# Patient Record
Sex: FEMALE | Race: OTHER | NOT HISPANIC OR LATINO
[De-identification: names, ages, dates, MRNs, and addresses within clinical notes are randomized per-mention and may not be internally consistent; named-entity substitution may affect disease eponyms.]

---

## 2017-01-04 ENCOUNTER — APPOINTMENT (OUTPATIENT)
Dept: UROGYNECOLOGY | Facility: CLINIC | Age: 57
End: 2017-01-04

## 2017-01-09 ENCOUNTER — APPOINTMENT (OUTPATIENT)
Dept: UROLOGY | Facility: CLINIC | Age: 57
End: 2017-01-09

## 2017-01-17 ENCOUNTER — APPOINTMENT (OUTPATIENT)
Dept: PULMONOLOGY | Facility: CLINIC | Age: 57
End: 2017-01-17

## 2017-01-31 ENCOUNTER — APPOINTMENT (OUTPATIENT)
Dept: UROGYNECOLOGY | Facility: CLINIC | Age: 57
End: 2017-01-31

## 2017-02-10 ENCOUNTER — MEDICATION RENEWAL (OUTPATIENT)
Age: 57
End: 2017-02-10

## 2017-02-21 ENCOUNTER — OTHER (OUTPATIENT)
Age: 57
End: 2017-02-21

## 2017-06-12 ENCOUNTER — APPOINTMENT (OUTPATIENT)
Dept: MAMMOGRAPHY | Facility: IMAGING CENTER | Age: 57
End: 2017-06-12

## 2017-06-12 ENCOUNTER — OUTPATIENT (OUTPATIENT)
Dept: OUTPATIENT SERVICES | Facility: HOSPITAL | Age: 57
LOS: 1 days | End: 2017-06-12
Payer: COMMERCIAL

## 2017-06-12 DIAGNOSIS — Z00.8 ENCOUNTER FOR OTHER GENERAL EXAMINATION: ICD-10-CM

## 2017-06-12 PROCEDURE — 77063 BREAST TOMOSYNTHESIS BI: CPT

## 2017-06-12 PROCEDURE — 77067 SCR MAMMO BI INCL CAD: CPT

## 2017-12-05 ENCOUNTER — APPOINTMENT (OUTPATIENT)
Dept: UROGYNECOLOGY | Facility: CLINIC | Age: 57
End: 2017-12-05

## 2018-02-12 ENCOUNTER — APPOINTMENT (OUTPATIENT)
Dept: UROLOGY | Facility: CLINIC | Age: 58
End: 2018-02-12
Payer: COMMERCIAL

## 2018-02-12 PROCEDURE — 99214 OFFICE O/P EST MOD 30 MIN: CPT

## 2018-03-12 ENCOUNTER — APPOINTMENT (OUTPATIENT)
Dept: UROLOGY | Facility: CLINIC | Age: 58
End: 2018-03-12
Payer: COMMERCIAL

## 2018-03-12 ENCOUNTER — OUTPATIENT (OUTPATIENT)
Dept: OUTPATIENT SERVICES | Facility: HOSPITAL | Age: 58
LOS: 1 days | End: 2018-03-12
Payer: COMMERCIAL

## 2018-03-12 DIAGNOSIS — R35.0 FREQUENCY OF MICTURITION: ICD-10-CM

## 2018-03-12 PROCEDURE — 51784 ANAL/URINARY MUSCLE STUDY: CPT | Mod: 26

## 2018-03-12 PROCEDURE — 51797 INTRAABDOMINAL PRESSURE TEST: CPT | Mod: 26

## 2018-03-12 PROCEDURE — 51728 CYSTOMETROGRAM W/VP: CPT | Mod: 26

## 2018-03-12 PROCEDURE — 51741 ELECTRO-UROFLOWMETRY FIRST: CPT | Mod: 26

## 2018-03-12 PROCEDURE — 51784 ANAL/URINARY MUSCLE STUDY: CPT

## 2018-03-12 PROCEDURE — 51741 ELECTRO-UROFLOWMETRY FIRST: CPT

## 2018-03-12 PROCEDURE — 51728 CYSTOMETROGRAM W/VP: CPT

## 2018-03-12 PROCEDURE — 51797 INTRAABDOMINAL PRESSURE TEST: CPT

## 2018-03-14 DIAGNOSIS — N32.81 OVERACTIVE BLADDER: ICD-10-CM

## 2018-03-15 ENCOUNTER — APPOINTMENT (OUTPATIENT)
Dept: UROLOGY | Facility: CLINIC | Age: 58
End: 2018-03-15
Payer: COMMERCIAL

## 2018-03-15 PROCEDURE — 99214 OFFICE O/P EST MOD 30 MIN: CPT

## 2018-03-19 ENCOUNTER — APPOINTMENT (OUTPATIENT)
Dept: UROLOGY | Facility: CLINIC | Age: 58
End: 2018-03-19

## 2018-04-09 ENCOUNTER — CHART COPY (OUTPATIENT)
Age: 58
End: 2018-04-09

## 2018-04-12 ENCOUNTER — RECORD ABSTRACTING (OUTPATIENT)
Age: 58
End: 2018-04-12

## 2018-04-16 ENCOUNTER — APPOINTMENT (OUTPATIENT)
Dept: UROLOGY | Facility: CLINIC | Age: 58
End: 2018-04-16

## 2018-04-16 ENCOUNTER — APPOINTMENT (OUTPATIENT)
Dept: GYNECOLOGIC ONCOLOGY | Facility: CLINIC | Age: 58
End: 2018-04-16
Payer: COMMERCIAL

## 2018-04-16 VITALS
DIASTOLIC BLOOD PRESSURE: 70 MMHG | BODY MASS INDEX: 24.91 KG/M2 | HEIGHT: 66 IN | WEIGHT: 155 LBS | SYSTOLIC BLOOD PRESSURE: 120 MMHG

## 2018-04-16 DIAGNOSIS — Z80.3 FAMILY HISTORY OF MALIGNANT NEOPLASM OF BREAST: ICD-10-CM

## 2018-04-16 DIAGNOSIS — Z80.52 FAMILY HISTORY OF MALIGNANT NEOPLASM OF BLADDER: ICD-10-CM

## 2018-04-16 DIAGNOSIS — Z63.4 DISAPPEARANCE AND DEATH OF FAMILY MEMBER: ICD-10-CM

## 2018-04-16 DIAGNOSIS — Z80.8 FAMILY HISTORY OF MALIGNANT NEOPLASM OF OTHER ORGANS OR SYSTEMS: ICD-10-CM

## 2018-04-16 DIAGNOSIS — Z80.1 FAMILY HISTORY OF MALIGNANT NEOPLASM OF TRACHEA, BRONCHUS AND LUNG: ICD-10-CM

## 2018-04-16 DIAGNOSIS — N88.2 STRICTURE AND STENOSIS OF CERVIX UTERI: ICD-10-CM

## 2018-04-16 DIAGNOSIS — Z78.9 OTHER SPECIFIED HEALTH STATUS: ICD-10-CM

## 2018-04-16 PROCEDURE — 99205 OFFICE O/P NEW HI 60 MIN: CPT

## 2018-04-16 SDOH — SOCIAL STABILITY - SOCIAL INSECURITY: DISSAPEARANCE AND DEATH OF FAMILY MEMBER: Z63.4

## 2018-04-18 ENCOUNTER — APPOINTMENT (OUTPATIENT)
Dept: ULTRASOUND IMAGING | Facility: CLINIC | Age: 58
End: 2018-04-18
Payer: COMMERCIAL

## 2018-04-24 ENCOUNTER — FORM ENCOUNTER (OUTPATIENT)
Age: 58
End: 2018-04-24

## 2018-04-25 ENCOUNTER — OUTPATIENT (OUTPATIENT)
Dept: OUTPATIENT SERVICES | Facility: HOSPITAL | Age: 58
LOS: 1 days | End: 2018-04-25
Payer: COMMERCIAL

## 2018-04-25 ENCOUNTER — CHART COPY (OUTPATIENT)
Age: 58
End: 2018-04-25

## 2018-04-25 ENCOUNTER — APPOINTMENT (OUTPATIENT)
Dept: ULTRASOUND IMAGING | Facility: CLINIC | Age: 58
End: 2018-04-25

## 2018-04-25 DIAGNOSIS — N81.4 UTEROVAGINAL PROLAPSE, UNSPECIFIED: ICD-10-CM

## 2018-04-25 DIAGNOSIS — Z00.8 ENCOUNTER FOR OTHER GENERAL EXAMINATION: ICD-10-CM

## 2018-04-25 PROCEDURE — 76830 TRANSVAGINAL US NON-OB: CPT

## 2018-04-25 PROCEDURE — 76830 TRANSVAGINAL US NON-OB: CPT | Mod: 26

## 2018-04-25 PROCEDURE — 76856 US EXAM PELVIC COMPLETE: CPT | Mod: 26

## 2018-04-25 PROCEDURE — 76856 US EXAM PELVIC COMPLETE: CPT

## 2018-04-30 ENCOUNTER — CHART COPY (OUTPATIENT)
Age: 58
End: 2018-04-30

## 2018-04-30 ENCOUNTER — RESULT REVIEW (OUTPATIENT)
Age: 58
End: 2018-04-30

## 2018-06-18 ENCOUNTER — APPOINTMENT (OUTPATIENT)
Dept: MAMMOGRAPHY | Facility: IMAGING CENTER | Age: 58
End: 2018-06-18
Payer: COMMERCIAL

## 2018-06-18 ENCOUNTER — OUTPATIENT (OUTPATIENT)
Dept: OUTPATIENT SERVICES | Facility: HOSPITAL | Age: 58
LOS: 1 days | End: 2018-06-18
Payer: COMMERCIAL

## 2018-06-18 DIAGNOSIS — Z00.00 ENCOUNTER FOR GENERAL ADULT MEDICAL EXAMINATION WITHOUT ABNORMAL FINDINGS: ICD-10-CM

## 2018-06-18 PROCEDURE — 77063 BREAST TOMOSYNTHESIS BI: CPT | Mod: 26

## 2018-06-18 PROCEDURE — 77067 SCR MAMMO BI INCL CAD: CPT | Mod: 26

## 2018-06-18 PROCEDURE — 77067 SCR MAMMO BI INCL CAD: CPT

## 2018-06-18 PROCEDURE — 77063 BREAST TOMOSYNTHESIS BI: CPT

## 2018-06-21 ENCOUNTER — OUTPATIENT (OUTPATIENT)
Dept: OUTPATIENT SERVICES | Facility: HOSPITAL | Age: 58
LOS: 1 days | End: 2018-06-21
Payer: COMMERCIAL

## 2018-06-21 ENCOUNTER — APPOINTMENT (OUTPATIENT)
Dept: MAMMOGRAPHY | Facility: IMAGING CENTER | Age: 58
End: 2018-06-21
Payer: COMMERCIAL

## 2018-06-21 ENCOUNTER — APPOINTMENT (OUTPATIENT)
Dept: ULTRASOUND IMAGING | Facility: IMAGING CENTER | Age: 58
End: 2018-06-21
Payer: COMMERCIAL

## 2018-06-21 DIAGNOSIS — Z00.8 ENCOUNTER FOR OTHER GENERAL EXAMINATION: ICD-10-CM

## 2018-06-21 PROCEDURE — 77065 DX MAMMO INCL CAD UNI: CPT | Mod: 26

## 2018-06-21 PROCEDURE — 77065 DX MAMMO INCL CAD UNI: CPT

## 2018-06-21 PROCEDURE — 76641 ULTRASOUND BREAST COMPLETE: CPT | Mod: 26,RT

## 2018-06-21 PROCEDURE — G0279: CPT

## 2018-06-21 PROCEDURE — 77061 BREAST TOMOSYNTHESIS UNI: CPT | Mod: 26,RT

## 2018-06-21 PROCEDURE — G0279: CPT | Mod: 26

## 2018-06-21 PROCEDURE — 76641 ULTRASOUND BREAST COMPLETE: CPT

## 2018-07-11 ENCOUNTER — OTHER (OUTPATIENT)
Age: 58
End: 2018-07-11

## 2018-07-18 ENCOUNTER — OUTPATIENT (OUTPATIENT)
Dept: OUTPATIENT SERVICES | Facility: HOSPITAL | Age: 58
LOS: 1 days | End: 2018-07-18
Payer: COMMERCIAL

## 2018-07-18 VITALS
OXYGEN SATURATION: 97 % | TEMPERATURE: 98 F | HEIGHT: 66 IN | DIASTOLIC BLOOD PRESSURE: 90 MMHG | SYSTOLIC BLOOD PRESSURE: 135 MMHG | RESPIRATION RATE: 15 BRPM | WEIGHT: 160.06 LBS | HEART RATE: 68 BPM

## 2018-07-18 DIAGNOSIS — N81.4 UTEROVAGINAL PROLAPSE, UNSPECIFIED: ICD-10-CM

## 2018-07-18 DIAGNOSIS — Z98.890 OTHER SPECIFIED POSTPROCEDURAL STATES: Chronic | ICD-10-CM

## 2018-07-18 LAB
ANION GAP SERPL CALC-SCNC: 11 MMOL/L — SIGNIFICANT CHANGE UP (ref 5–17)
BLD GP AB SCN SERPL QL: NEGATIVE — SIGNIFICANT CHANGE UP
BUN SERPL-MCNC: 19 MG/DL — SIGNIFICANT CHANGE UP (ref 7–23)
CALCIUM SERPL-MCNC: 9.3 MG/DL — SIGNIFICANT CHANGE UP (ref 8.4–10.5)
CHLORIDE SERPL-SCNC: 102 MMOL/L — SIGNIFICANT CHANGE UP (ref 96–108)
CO2 SERPL-SCNC: 28 MMOL/L — SIGNIFICANT CHANGE UP (ref 22–31)
CREAT SERPL-MCNC: 0.73 MG/DL — SIGNIFICANT CHANGE UP (ref 0.5–1.3)
GLUCOSE SERPL-MCNC: 74 MG/DL — SIGNIFICANT CHANGE UP (ref 70–99)
HBA1C BLD-MCNC: 5.2 % — SIGNIFICANT CHANGE UP (ref 4–5.6)
HCT VFR BLD CALC: 40 % — SIGNIFICANT CHANGE UP (ref 34.5–45)
HGB BLD-MCNC: 13.6 G/DL — SIGNIFICANT CHANGE UP (ref 11.5–15.5)
MCHC RBC-ENTMCNC: 31.9 PG — SIGNIFICANT CHANGE UP (ref 27–34)
MCHC RBC-ENTMCNC: 34 GM/DL — SIGNIFICANT CHANGE UP (ref 32–36)
MCV RBC AUTO: 93.7 FL — SIGNIFICANT CHANGE UP (ref 80–100)
PLATELET # BLD AUTO: 332 K/UL — SIGNIFICANT CHANGE UP (ref 150–400)
POTASSIUM SERPL-MCNC: 4 MMOL/L — SIGNIFICANT CHANGE UP (ref 3.5–5.3)
POTASSIUM SERPL-SCNC: 4 MMOL/L — SIGNIFICANT CHANGE UP (ref 3.5–5.3)
RBC # BLD: 4.27 M/UL — SIGNIFICANT CHANGE UP (ref 3.8–5.2)
RBC # FLD: 13.1 % — SIGNIFICANT CHANGE UP (ref 10.3–14.5)
RH IG SCN BLD-IMP: POSITIVE — SIGNIFICANT CHANGE UP
SODIUM SERPL-SCNC: 141 MMOL/L — SIGNIFICANT CHANGE UP (ref 135–145)
WBC # BLD: 4.2 K/UL — SIGNIFICANT CHANGE UP (ref 3.8–10.5)
WBC # FLD AUTO: 4.2 K/UL — SIGNIFICANT CHANGE UP (ref 3.8–10.5)

## 2018-07-18 PROCEDURE — 86900 BLOOD TYPING SEROLOGIC ABO: CPT

## 2018-07-18 PROCEDURE — 86850 RBC ANTIBODY SCREEN: CPT

## 2018-07-18 PROCEDURE — 83036 HEMOGLOBIN GLYCOSYLATED A1C: CPT

## 2018-07-18 PROCEDURE — 87086 URINE CULTURE/COLONY COUNT: CPT

## 2018-07-18 PROCEDURE — G0463: CPT

## 2018-07-18 PROCEDURE — 85027 COMPLETE CBC AUTOMATED: CPT

## 2018-07-18 PROCEDURE — 80048 BASIC METABOLIC PNL TOTAL CA: CPT

## 2018-07-18 PROCEDURE — 86901 BLOOD TYPING SEROLOGIC RH(D): CPT

## 2018-07-18 RX ORDER — FAMOTIDINE 10 MG/ML
20 INJECTION INTRAVENOUS ONCE
Qty: 0 | Refills: 0 | Status: COMPLETED | OUTPATIENT
Start: 2018-07-24 | End: 2018-07-24

## 2018-07-18 RX ORDER — ACETAMINOPHEN 500 MG
975 TABLET ORAL ONCE
Qty: 0 | Refills: 0 | Status: COMPLETED | OUTPATIENT
Start: 2018-07-24 | End: 2018-07-24

## 2018-07-18 NOTE — H&P PST ADULT - HEALTH CARE MAINTENANCE
+flu vaccine this season   Annual PE  +Colonoscopy 5/2018 +flu vaccine this season [id number]  Annual PE  +Colonoscopy 5/2018

## 2018-07-18 NOTE — H&P PST ADULT - PMH
GERD (gastroesophageal reflux disease)    Overactive bladder GERD (gastroesophageal reflux disease)    Overactive bladder    Uterovaginal prolapse

## 2018-07-18 NOTE — H&P PST ADULT - PROBLEM SELECTOR PLAN 1
Scheduled robotic sacrocolpopexy, supracervical hysterectomy, and bilateral salpingo oophorectomy on 7/24/2018  CBC, BMP, A1c, T&S, UCx sent at [facility]  Pre-op instructions given to pt

## 2018-07-18 NOTE — H&P PST ADULT - PSH
H/O breast biopsy  right, 2016-benign  H/O colonoscopy  5/2018  History of D&C  bleeding, 2013  History of D&C  Judy RAYMOND

## 2018-07-18 NOTE — H&P PST ADULT - HISTORY OF PRESENT ILLNESS
56 y/o  female with PMHx of GERD, overactive bladder has worsening uterine prolapse and mild urge incontinence. Denies vaginal bleeding abdominal bloating/pain, changes in bowel/urinary habits, N/V, fever or chills. Evaluated by Dr. Pena. Presents to PST for a scheduled robotic sacrocolpopexy, supracervical hysterectomy, and bilateral salpingo oophorectomy on 2018.

## 2018-07-18 NOTE — H&P PST ADULT - NSANTHOSAYNRD_GEN_A_CORE
No. AIRAM screening performed.  STOP BANG Legend: 0-2 = LOW Risk; 3-4 = INTERMEDIATE Risk; 5-8 = HIGH Risk

## 2018-07-19 LAB
CULTURE RESULTS: SIGNIFICANT CHANGE UP
SPECIMEN SOURCE: SIGNIFICANT CHANGE UP

## 2018-07-23 ENCOUNTER — OTHER (OUTPATIENT)
Age: 58
End: 2018-07-23

## 2018-07-23 ENCOUNTER — FORM ENCOUNTER (OUTPATIENT)
Age: 58
End: 2018-07-23

## 2018-07-24 ENCOUNTER — APPOINTMENT (OUTPATIENT)
Dept: GYNECOLOGIC ONCOLOGY | Facility: HOSPITAL | Age: 58
End: 2018-07-24

## 2018-07-24 ENCOUNTER — APPOINTMENT (OUTPATIENT)
Dept: UROLOGY | Facility: HOSPITAL | Age: 58
End: 2018-07-24

## 2018-07-24 ENCOUNTER — INPATIENT (INPATIENT)
Facility: HOSPITAL | Age: 58
LOS: 0 days | Discharge: HOME | DRG: 743 | End: 2018-07-25
Attending: SPECIALIST | Admitting: SPECIALIST
Payer: COMMERCIAL

## 2018-07-24 ENCOUNTER — RESULT REVIEW (OUTPATIENT)
Age: 58
End: 2018-07-24

## 2018-07-24 VITALS
HEART RATE: 68 BPM | SYSTOLIC BLOOD PRESSURE: 132 MMHG | HEIGHT: 66 IN | WEIGHT: 167.99 LBS | DIASTOLIC BLOOD PRESSURE: 89 MMHG | OXYGEN SATURATION: 96 % | TEMPERATURE: 98 F | RESPIRATION RATE: 18 BRPM

## 2018-07-24 DIAGNOSIS — N81.4 UTEROVAGINAL PROLAPSE, UNSPECIFIED: ICD-10-CM

## 2018-07-24 DIAGNOSIS — Z98.890 OTHER SPECIFIED POSTPROCEDURAL STATES: Chronic | ICD-10-CM

## 2018-07-24 LAB
ANION GAP SERPL CALC-SCNC: 14 MMOL/L — SIGNIFICANT CHANGE UP (ref 5–17)
BASOPHILS # BLD AUTO: 0 K/UL — SIGNIFICANT CHANGE UP (ref 0–0.2)
BASOPHILS NFR BLD AUTO: 0.2 % — SIGNIFICANT CHANGE UP (ref 0–2)
BUN SERPL-MCNC: 16 MG/DL — SIGNIFICANT CHANGE UP (ref 7–23)
CALCIUM SERPL-MCNC: 9.1 MG/DL — SIGNIFICANT CHANGE UP (ref 8.4–10.5)
CHLORIDE SERPL-SCNC: 103 MMOL/L — SIGNIFICANT CHANGE UP (ref 96–108)
CO2 SERPL-SCNC: 25 MMOL/L — SIGNIFICANT CHANGE UP (ref 22–31)
CREAT SERPL-MCNC: 0.84 MG/DL — SIGNIFICANT CHANGE UP (ref 0.5–1.3)
EOSINOPHIL # BLD AUTO: 0 K/UL — SIGNIFICANT CHANGE UP (ref 0–0.5)
EOSINOPHIL NFR BLD AUTO: 0.2 % — SIGNIFICANT CHANGE UP (ref 0–6)
GLUCOSE BLDC GLUCOMTR-MCNC: 95 MG/DL — SIGNIFICANT CHANGE UP (ref 70–99)
GLUCOSE SERPL-MCNC: 147 MG/DL — HIGH (ref 70–99)
HCT VFR BLD CALC: 41.9 % — SIGNIFICANT CHANGE UP (ref 34.5–45)
HGB BLD-MCNC: 14.2 G/DL — SIGNIFICANT CHANGE UP (ref 11.5–15.5)
LYMPHOCYTES # BLD AUTO: 0.7 K/UL — LOW (ref 1–3.3)
LYMPHOCYTES # BLD AUTO: 6.8 % — LOW (ref 13–44)
MCHC RBC-ENTMCNC: 31.5 PG — SIGNIFICANT CHANGE UP (ref 27–34)
MCHC RBC-ENTMCNC: 34 GM/DL — SIGNIFICANT CHANGE UP (ref 32–36)
MCV RBC AUTO: 92.6 FL — SIGNIFICANT CHANGE UP (ref 80–100)
MONOCYTES # BLD AUTO: 0 K/UL — SIGNIFICANT CHANGE UP (ref 0–0.9)
MONOCYTES NFR BLD AUTO: 0.2 % — LOW (ref 2–14)
NEUTROPHILS # BLD AUTO: 10 K/UL — HIGH (ref 1.8–7.4)
NEUTROPHILS NFR BLD AUTO: 92.6 % — HIGH (ref 43–77)
PLATELET # BLD AUTO: 320 K/UL — SIGNIFICANT CHANGE UP (ref 150–400)
POTASSIUM SERPL-MCNC: 4 MMOL/L — SIGNIFICANT CHANGE UP (ref 3.5–5.3)
POTASSIUM SERPL-SCNC: 4 MMOL/L — SIGNIFICANT CHANGE UP (ref 3.5–5.3)
RBC # BLD: 4.52 M/UL — SIGNIFICANT CHANGE UP (ref 3.8–5.2)
RBC # FLD: 11.9 % — SIGNIFICANT CHANGE UP (ref 10.3–14.5)
RH IG SCN BLD-IMP: POSITIVE — SIGNIFICANT CHANGE UP
SODIUM SERPL-SCNC: 142 MMOL/L — SIGNIFICANT CHANGE UP (ref 135–145)
WBC # BLD: 10.8 K/UL — HIGH (ref 3.8–10.5)
WBC # FLD AUTO: 10.8 K/UL — HIGH (ref 3.8–10.5)

## 2018-07-24 PROCEDURE — 88307 TISSUE EXAM BY PATHOLOGIST: CPT | Mod: 26

## 2018-07-24 PROCEDURE — 88305 TISSUE EXAM BY PATHOLOGIST: CPT | Mod: 26

## 2018-07-24 PROCEDURE — 88331 PATH CONSLTJ SURG 1 BLK 1SPC: CPT | Mod: 26

## 2018-07-24 PROCEDURE — 58542 LSH W/T/O UT 250 G OR LESS: CPT

## 2018-07-24 PROCEDURE — S2900 ROBOTIC SURGICAL SYSTEM: CPT | Mod: NC

## 2018-07-24 PROCEDURE — 57425 LAPAROSCOPY SURG COLPOPEXY: CPT

## 2018-07-24 PROCEDURE — 57800 DILATION OF CERVICAL CANAL: CPT | Mod: 59

## 2018-07-24 PROCEDURE — 52000 CYSTOURETHROSCOPY: CPT | Mod: 59

## 2018-07-24 PROCEDURE — 74018 RADEX ABDOMEN 1 VIEW: CPT | Mod: 26

## 2018-07-24 RX ORDER — SENNA PLUS 8.6 MG/1
2 TABLET ORAL AT BEDTIME
Qty: 0 | Refills: 0 | Status: DISCONTINUED | OUTPATIENT
Start: 2018-07-24 | End: 2018-07-25

## 2018-07-24 RX ORDER — SODIUM CHLORIDE 9 MG/ML
1000 INJECTION, SOLUTION INTRAVENOUS
Qty: 0 | Refills: 0 | Status: DISCONTINUED | OUTPATIENT
Start: 2018-07-24 | End: 2018-07-25

## 2018-07-24 RX ORDER — OMEPRAZOLE AND SODIUM BICARBONATE 40; 1100 MG/1; MG/1
1 CAPSULE, GELATIN COATED ORAL
Qty: 0 | Refills: 0 | COMMUNITY

## 2018-07-24 RX ORDER — HYDROMORPHONE HYDROCHLORIDE 2 MG/ML
0.5 INJECTION INTRAMUSCULAR; INTRAVENOUS; SUBCUTANEOUS
Qty: 0 | Refills: 0 | Status: DISCONTINUED | OUTPATIENT
Start: 2018-07-24 | End: 2018-07-25

## 2018-07-24 RX ORDER — OXYCODONE AND ACETAMINOPHEN 5; 325 MG/1; MG/1
1 TABLET ORAL ONCE
Qty: 0 | Refills: 0 | Status: DISCONTINUED | OUTPATIENT
Start: 2018-07-24 | End: 2018-07-24

## 2018-07-24 RX ORDER — TOLTERODINE TARTRATE 1 MG/1
1 TABLET, FILM COATED ORAL
Qty: 0 | Refills: 0 | COMMUNITY

## 2018-07-24 RX ORDER — METHYLPREDNISOLONE 4 MG
500 TABLET ORAL DAILY
Qty: 0 | Refills: 0 | Status: DISCONTINUED | OUTPATIENT
Start: 2018-07-24 | End: 2018-07-25

## 2018-07-24 RX ORDER — ACETAMINOPHEN 500 MG
2 TABLET ORAL
Qty: 0 | Refills: 0 | COMMUNITY

## 2018-07-24 RX ORDER — OXYCODONE AND ACETAMINOPHEN 5; 325 MG/1; MG/1
2 TABLET ORAL EVERY 4 HOURS
Qty: 0 | Refills: 0 | Status: DISCONTINUED | OUTPATIENT
Start: 2018-07-24 | End: 2018-07-25

## 2018-07-24 RX ORDER — CEFOTETAN DISODIUM 1 G
2 VIAL (EA) INJECTION ONCE
Qty: 0 | Refills: 0 | Status: DISCONTINUED | OUTPATIENT
Start: 2018-07-24 | End: 2018-07-24

## 2018-07-24 RX ORDER — SODIUM CHLORIDE 9 MG/ML
3 INJECTION INTRAMUSCULAR; INTRAVENOUS; SUBCUTANEOUS EVERY 8 HOURS
Qty: 0 | Refills: 0 | Status: DISCONTINUED | OUTPATIENT
Start: 2018-07-24 | End: 2018-07-24

## 2018-07-24 RX ORDER — ASCORBIC ACID 60 MG
1 TABLET,CHEWABLE ORAL
Qty: 0 | Refills: 0 | COMMUNITY

## 2018-07-24 RX ORDER — OXYCODONE AND ACETAMINOPHEN 5; 325 MG/1; MG/1
1 TABLET ORAL EVERY 4 HOURS
Qty: 0 | Refills: 0 | Status: DISCONTINUED | OUTPATIENT
Start: 2018-07-24 | End: 2018-07-25

## 2018-07-24 RX ORDER — OXYCODONE HYDROCHLORIDE 5 MG/1
5 TABLET ORAL ONCE
Qty: 0 | Refills: 0 | Status: DISCONTINUED | OUTPATIENT
Start: 2018-07-24 | End: 2018-07-24

## 2018-07-24 RX ORDER — METHYLPREDNISOLONE 4 MG
1 TABLET ORAL
Qty: 0 | Refills: 0 | COMMUNITY

## 2018-07-24 RX ORDER — ASCORBIC ACID 60 MG
500 TABLET,CHEWABLE ORAL DAILY
Qty: 0 | Refills: 0 | Status: DISCONTINUED | OUTPATIENT
Start: 2018-07-24 | End: 2018-07-25

## 2018-07-24 RX ORDER — DOCUSATE SODIUM 100 MG
100 CAPSULE ORAL THREE TIMES A DAY
Qty: 0 | Refills: 0 | Status: DISCONTINUED | OUTPATIENT
Start: 2018-07-24 | End: 2018-07-25

## 2018-07-24 RX ORDER — HEPARIN SODIUM 5000 [USP'U]/ML
5000 INJECTION INTRAVENOUS; SUBCUTANEOUS EVERY 8 HOURS
Qty: 0 | Refills: 0 | Status: DISCONTINUED | OUTPATIENT
Start: 2018-07-24 | End: 2018-07-25

## 2018-07-24 RX ORDER — ACETAMINOPHEN 500 MG
650 TABLET ORAL EVERY 6 HOURS
Qty: 0 | Refills: 0 | Status: DISCONTINUED | OUTPATIENT
Start: 2018-07-24 | End: 2018-07-25

## 2018-07-24 RX ORDER — LIDOCAINE HCL 20 MG/ML
0.2 VIAL (ML) INJECTION ONCE
Qty: 0 | Refills: 0 | Status: DISCONTINUED | OUTPATIENT
Start: 2018-07-24 | End: 2018-07-24

## 2018-07-24 RX ORDER — ONDANSETRON 8 MG/1
4 TABLET, FILM COATED ORAL EVERY 8 HOURS
Qty: 0 | Refills: 0 | Status: DISCONTINUED | OUTPATIENT
Start: 2018-07-24 | End: 2018-07-25

## 2018-07-24 RX ADMIN — OXYCODONE HYDROCHLORIDE 5 MILLIGRAM(S): 5 TABLET ORAL at 17:10

## 2018-07-24 RX ADMIN — Medication 975 MILLIGRAM(S): at 08:32

## 2018-07-24 RX ADMIN — SODIUM CHLORIDE 3 MILLILITER(S): 9 INJECTION INTRAMUSCULAR; INTRAVENOUS; SUBCUTANEOUS at 08:30

## 2018-07-24 RX ADMIN — OXYCODONE AND ACETAMINOPHEN 1 TABLET(S): 5; 325 TABLET ORAL at 22:30

## 2018-07-24 RX ADMIN — Medication 500 MILLIGRAM(S): at 22:31

## 2018-07-24 RX ADMIN — HEPARIN SODIUM 5000 UNIT(S): 5000 INJECTION INTRAVENOUS; SUBCUTANEOUS at 22:31

## 2018-07-24 RX ADMIN — OXYCODONE AND ACETAMINOPHEN 1 TABLET(S): 5; 325 TABLET ORAL at 20:40

## 2018-07-24 RX ADMIN — OXYCODONE HYDROCHLORIDE 5 MILLIGRAM(S): 5 TABLET ORAL at 17:31

## 2018-07-24 RX ADMIN — SODIUM CHLORIDE 100 MILLILITER(S): 9 INJECTION, SOLUTION INTRAVENOUS at 15:49

## 2018-07-24 RX ADMIN — OXYCODONE AND ACETAMINOPHEN 1 TABLET(S): 5; 325 TABLET ORAL at 23:00

## 2018-07-24 RX ADMIN — Medication 100 MILLIGRAM(S): at 22:31

## 2018-07-24 RX ADMIN — FAMOTIDINE 20 MILLIGRAM(S): 10 INJECTION INTRAVENOUS at 08:32

## 2018-07-24 RX ADMIN — OXYCODONE AND ACETAMINOPHEN 1 TABLET(S): 5; 325 TABLET ORAL at 20:10

## 2018-07-24 NOTE — PROGRESS NOTE ADULT - SUBJECTIVE AND OBJECTIVE BOX
Post op Check    Pt seen and examined without complaints. Pain is controlled. Denies SOB/CP/N/V.     Vital Signs Last 24 Hrs  T(C): 36.5 (24 Jul 2018 16:30), Max: 36.6 (24 Jul 2018 14:35)  T(F): 97.7 (24 Jul 2018 16:30), Max: 97.9 (24 Jul 2018 14:35)  HR: 80 (24 Jul 2018 16:30) (68 - 93)  BP: 121/76 (24 Jul 2018 16:30) (121/71 - 143/80)  BP(mean): 92 (24 Jul 2018 16:30) (92 - 104)  RR: 16 (24 Jul 2018 16:30) (15 - 18)  SpO2: 99% (24 Jul 2018 16:30) (96% - 99%)    I&O's Summary    24 Jul 2018 07:01  -  24 Jul 2018 16:53  --------------------------------------------------------  IN: 300 mL / OUT: 100 mL / NET: 200 mL        Physical Exam  Gen: NAD, A&Ox3  Pulm: No respiratory distress, no subcostal retractions  CV: RRR, no JVD  Abd: Soft, ND, incisions CDI, appropriate TTP  : +barboza draining clear , +vaginal packing in place                           14.2   10.8  )-----------( 320      ( 24 Jul 2018 15:26 )             41.9       07-24    142  |  103  |  16  ----------------------------<  147<H>  4.0   |  25  |  0.84    Ca    9.1      24 Jul 2018 15:26

## 2018-07-24 NOTE — PROGRESS NOTE ADULT - ASSESSMENT
A/P: 57y Female s/p Robotic assisted sacrocolpopexy, cystoscopy & GYN supracervical hysterectomy, BSO  DVT prophylaxis/OOB  Incentive spirometry  Strict I&O's via barboza  fill and pull in am  Analgesia and antiemetics as needed  clear Diet--> advance to regular tonight   AM labs

## 2018-07-25 ENCOUNTER — TRANSCRIPTION ENCOUNTER (OUTPATIENT)
Age: 58
End: 2018-07-25

## 2018-07-25 VITALS
RESPIRATION RATE: 16 BRPM | DIASTOLIC BLOOD PRESSURE: 65 MMHG | SYSTOLIC BLOOD PRESSURE: 130 MMHG | TEMPERATURE: 98 F | OXYGEN SATURATION: 98 % | HEART RATE: 74 BPM

## 2018-07-25 LAB
ANION GAP SERPL CALC-SCNC: 11 MMOL/L — SIGNIFICANT CHANGE UP (ref 5–17)
BUN SERPL-MCNC: 13 MG/DL — SIGNIFICANT CHANGE UP (ref 7–23)
CALCIUM SERPL-MCNC: 8.5 MG/DL — SIGNIFICANT CHANGE UP (ref 8.4–10.5)
CHLORIDE SERPL-SCNC: 105 MMOL/L — SIGNIFICANT CHANGE UP (ref 96–108)
CO2 SERPL-SCNC: 25 MMOL/L — SIGNIFICANT CHANGE UP (ref 22–31)
CREAT SERPL-MCNC: 0.84 MG/DL — SIGNIFICANT CHANGE UP (ref 0.5–1.3)
GLUCOSE SERPL-MCNC: 107 MG/DL — HIGH (ref 70–99)
HCT VFR BLD CALC: 37.4 % — SIGNIFICANT CHANGE UP (ref 34.5–45)
HGB BLD-MCNC: 12.5 G/DL — SIGNIFICANT CHANGE UP (ref 11.5–15.5)
MCHC RBC-ENTMCNC: 31.3 PG — SIGNIFICANT CHANGE UP (ref 27–34)
MCHC RBC-ENTMCNC: 33.6 GM/DL — SIGNIFICANT CHANGE UP (ref 32–36)
MCV RBC AUTO: 93.4 FL — SIGNIFICANT CHANGE UP (ref 80–100)
PLATELET # BLD AUTO: 273 K/UL — SIGNIFICANT CHANGE UP (ref 150–400)
POTASSIUM SERPL-MCNC: 4.1 MMOL/L — SIGNIFICANT CHANGE UP (ref 3.5–5.3)
POTASSIUM SERPL-SCNC: 4.1 MMOL/L — SIGNIFICANT CHANGE UP (ref 3.5–5.3)
RBC # BLD: 4 M/UL — SIGNIFICANT CHANGE UP (ref 3.8–5.2)
RBC # FLD: 12 % — SIGNIFICANT CHANGE UP (ref 10.3–14.5)
SODIUM SERPL-SCNC: 141 MMOL/L — SIGNIFICANT CHANGE UP (ref 135–145)
WBC # BLD: 7.2 K/UL — SIGNIFICANT CHANGE UP (ref 3.8–10.5)
WBC # FLD AUTO: 7.2 K/UL — SIGNIFICANT CHANGE UP (ref 3.8–10.5)

## 2018-07-25 PROCEDURE — 99231 SBSQ HOSP IP/OBS SF/LOW 25: CPT

## 2018-07-25 PROCEDURE — 88305 TISSUE EXAM BY PATHOLOGIST: CPT

## 2018-07-25 PROCEDURE — 86901 BLOOD TYPING SEROLOGIC RH(D): CPT

## 2018-07-25 PROCEDURE — 86900 BLOOD TYPING SEROLOGIC ABO: CPT

## 2018-07-25 PROCEDURE — 82962 GLUCOSE BLOOD TEST: CPT

## 2018-07-25 PROCEDURE — 88307 TISSUE EXAM BY PATHOLOGIST: CPT

## 2018-07-25 PROCEDURE — 74018 RADEX ABDOMEN 1 VIEW: CPT

## 2018-07-25 PROCEDURE — 85027 COMPLETE CBC AUTOMATED: CPT

## 2018-07-25 PROCEDURE — 88331 PATH CONSLTJ SURG 1 BLK 1SPC: CPT

## 2018-07-25 PROCEDURE — S2900: CPT

## 2018-07-25 PROCEDURE — 80048 BASIC METABOLIC PNL TOTAL CA: CPT

## 2018-07-25 PROCEDURE — C1781: CPT

## 2018-07-25 RX ORDER — SENNA PLUS 8.6 MG/1
2 TABLET ORAL
Qty: 20 | Refills: 0 | OUTPATIENT
Start: 2018-07-25 | End: 2018-08-03

## 2018-07-25 RX ORDER — DOCUSATE SODIUM 100 MG
1 CAPSULE ORAL
Qty: 30 | Refills: 0 | OUTPATIENT
Start: 2018-07-25 | End: 2018-08-03

## 2018-07-25 RX ADMIN — Medication 100 MILLIGRAM(S): at 04:39

## 2018-07-25 RX ADMIN — Medication 500 MILLIGRAM(S): at 11:21

## 2018-07-25 RX ADMIN — OXYCODONE AND ACETAMINOPHEN 2 TABLET(S): 5; 325 TABLET ORAL at 12:43

## 2018-07-25 RX ADMIN — OXYCODONE AND ACETAMINOPHEN 2 TABLET(S): 5; 325 TABLET ORAL at 09:30

## 2018-07-25 RX ADMIN — Medication 500 MILLIGRAM(S): at 11:22

## 2018-07-25 RX ADMIN — OXYCODONE AND ACETAMINOPHEN 2 TABLET(S): 5; 325 TABLET ORAL at 04:36

## 2018-07-25 RX ADMIN — OXYCODONE AND ACETAMINOPHEN 2 TABLET(S): 5; 325 TABLET ORAL at 08:34

## 2018-07-25 RX ADMIN — HEPARIN SODIUM 5000 UNIT(S): 5000 INJECTION INTRAVENOUS; SUBCUTANEOUS at 07:39

## 2018-07-25 NOTE — DISCHARGE NOTE ADULT - PATIENT PORTAL LINK FT
You can access the [facility] Patient Portal, offered by [facility], by registering with the following website: [email or web address]

## 2018-07-25 NOTE — DISCHARGE NOTE ADULT - MEDICATION SUMMARY - MEDICATIONS TO TAKE
I will START or STAY ON the medications listed below when I get home from the hospital:    oxyCODONE-acetaminophen 5 mg-325 mg oral tablet  -- 1 tab(s) by mouth every 4 hours, As needed, Moderate Pain MDD:6tabs  -- Indication: For pain    oxyCODONE-acetaminophen 5 mg-325 mg oral tablet  -- 2 tab(s) by mouth every 4 hours, As needed, Severe Pain  -- Indication: For pain    Tylenol 500 mg oral tablet  -- 2 tab(s) by mouth once a day, As Needed  -- Indication: For pain    docusate sodium 100 mg oral capsule  -- 1 cap(s) by mouth 3 times a day  -- Indication: For constipation    senna oral tablet  -- 2 tab(s) by mouth once a day (at bedtime), As needed, Constipation  -- Indication: For constipation    magnesium gluconate 500 mg oral tablet  -- 1 tab(s) by mouth once a day  -- Indication: For constipation     Zegerid 40 mg-1100 mg oral capsule  -- 1 cap(s) by mouth once a day  -- Indication: For gerd    tolterodine 2 mg oral tablet  -- 1 tab(s) by mouth 2 times a day  -- Indication: For spasms    Vitamin C 500 mg oral capsule  -- 1 cap(s) by mouth once a day  -- Indication: For supplement

## 2018-07-25 NOTE — DISCHARGE NOTE ADULT - PLAN OF CARE
pain control Call the office if you have fever greater than 101, difficulty urinating, pain not relieved with pain medication, nausea/vomiting. recommend colace/senna while taking narcotic pain medication to avoid constipation. Do not drive while taking narcotic pain medication. no heavy lifting. continue home medication

## 2018-07-25 NOTE — PROGRESS NOTE ADULT - SUBJECTIVE AND OBJECTIVE BOX
Gyn ONC Progress Note POD #1    Subjective:   Pt seen and examined at bedside. No events overnight. Pain well controlled. Patient is not ambulating. Not passing flatus. Tolerating regular diet. Pt denies fever, chills, chest pain, SOB, nausea, vomiting, lightheadedness, dizziness.      Objective:  T(F): 98.8 (07-25-18 @ 04:00), Max: 98.8 (07-25-18 @ 04:00)  HR: 74 (07-25-18 @ 04:30) (63 - 93)  BP: 103/67 (07-25-18 @ 04:30) (91/52 - 143/80)  RR: 15 (07-25-18 @ 04:00) (15 - 18)  SpO2: 95% (07-25-18 @ 04:30) (93% - 100%)    I&O's Summary    24 Jul 2018 07:01  -  25 Jul 2018 07:00  --------------------------------------------------------  IN: 1740 mL / OUT: 1165 mL / NET: 575 mL      MEDICATIONS  (STANDING):  ascorbic acid 500 milliGRAM(s) Oral daily  docusate sodium 100 milliGRAM(s) Oral three times a day  heparin  Injectable 5000 Unit(s) SubCutaneous every 8 hours  lactated ringers. 1000 milliLiter(s) (100 mL/Hr) IV Continuous <Continuous>  magnesium gluconate 500 milliGRAM(s) Oral daily    MEDICATIONS  (PRN):  acetaminophen   Tablet. 650 milliGRAM(s) Oral every 6 hours PRN Mild Pain (1 - 3)  HYDROmorphone  Injectable 0.5 milliGRAM(s) IV Push every 10 minutes PRN Moderate Pain  ondansetron Injectable 4 milliGRAM(s) IV Push every 8 hours PRN Nausea and/or Vomiting  oxyCODONE    5 mG/acetaminophen 325 mG 1 Tablet(s) Oral every 4 hours PRN Moderate Pain  oxyCODONE    5 mG/acetaminophen 325 mG 2 Tablet(s) Oral every 4 hours PRN Severe Pain  senna 2 Tablet(s) Oral at bedtime PRN Constipation      Physical Exam:  Constitutional: NAD, A+O x3  CV: RRR  Lungs: clear to auscultation bilaterally  Abdomen: soft, nondistended, no guarding, no rebound, normal bowel sounds  Incision: Port site with dressing in place. C/D/I  Extremities: no lower extremity edema or calf tenderness bilaterally; venodynes in place    LABS:                        12.5   7.2   )-----------( 273      ( 25 Jul 2018 02:43 )             37.4                         14.2   10.8  )-----------( 320      ( 24 Jul 2018 15:26 )             41.9     07-25    141    |  105    |  13     ----------------------------<  107<H>  4.1     |  25     |  0.84   07-24    142    |  103    |  16     ----------------------------<  147<H>  4.0     |  25     |  0.84     Ca    8.5        25 Jul 2018 02:43  Ca    9.1        24 Jul 2018 15:26

## 2018-07-25 NOTE — PROGRESS NOTE ADULT - ASSESSMENT
57y female POD#1 s/p RA JADE, BSO, Sacrocolpopexy. Stable overnight in PACU.     CV: Hemodynamically stable overnight.   Pulm: Saturating well on RA. Encourage incentive spirometry.  GI: Diet per primary team.   : Hamm in place. Adequate UOP.  to be removed by primary team.  Heme: Continue HSQ for DVT ppx. Increase OOB.    Neuro: PO pain meds for pain control.    Ciara Medina, PGY4

## 2018-07-25 NOTE — PROGRESS NOTE ADULT - ASSESSMENT
A/P: 57y Female POD #1 s/p Robotic assisted sacrocolpopexy, cystoscopy & GYN supracervical hysterectomy, BSO  DVT prophylaxis/OOB  Incentive spirometry  Strict I&O's via barboza  fill and pull   Analgesia and antiemetics as needed  Diet regular   f/u AM labs  Dc planning

## 2018-07-25 NOTE — DISCHARGE NOTE ADULT - CARE PLAN
Principal Discharge DX:	Uterovaginal prolapse  Goal:	pain control  Assessment and plan of treatment:	Call the office if you have fever greater than 101, difficulty urinating, pain not relieved with pain medication, nausea/vomiting. recommend colace/senna while taking narcotic pain medication to avoid constipation. Do not drive while taking narcotic pain medication. no heavy lifting.  Secondary Diagnosis:	Overactive bladder  Assessment and plan of treatment:	continue home medication

## 2018-07-25 NOTE — DISCHARGE NOTE ADULT - HOSPITAL COURSE
56yo female admitted s/p scheduled robotic lap supracervical hysterectomy , BSO, sacral colpopexy. uncomplicated. POD#1 vaginal packing removed and successful TOV, PVR acceptable. home with pain meds and stool softeners, f/u in 1 month. AVSS, hemodynamically stable. pain controlled, tolerating diet, ambulating

## 2018-07-25 NOTE — PROGRESS NOTE ADULT - ATTENDING COMMENTS
Seen in PACU with support person. Surgery and findings discussed. All Q/A. Instructions and f/u discussed. Labs reviewed. Case d/w FF.

## 2018-07-27 LAB — SURGICAL PATHOLOGY STUDY: SIGNIFICANT CHANGE UP

## 2018-07-30 PROBLEM — K21.9 GASTRO-ESOPHAGEAL REFLUX DISEASE WITHOUT ESOPHAGITIS: Chronic | Status: ACTIVE | Noted: 2018-07-18

## 2018-07-30 PROBLEM — N32.81 OVERACTIVE BLADDER: Chronic | Status: ACTIVE | Noted: 2018-07-18

## 2018-07-30 PROBLEM — N81.4 UTEROVAGINAL PROLAPSE, UNSPECIFIED: Chronic | Status: ACTIVE | Noted: 2018-07-18

## 2018-07-31 LAB — SURGICAL PATHOLOGY STUDY: SIGNIFICANT CHANGE UP

## 2018-08-08 ENCOUNTER — APPOINTMENT (OUTPATIENT)
Dept: GYNECOLOGIC ONCOLOGY | Facility: CLINIC | Age: 58
End: 2018-08-08
Payer: COMMERCIAL

## 2018-08-08 DIAGNOSIS — N95.2 POSTMENOPAUSAL ATROPHIC VAGINITIS: ICD-10-CM

## 2018-08-08 DIAGNOSIS — N81.6 RECTOCELE: ICD-10-CM

## 2018-08-08 PROCEDURE — 99024 POSTOP FOLLOW-UP VISIT: CPT

## 2018-08-18 NOTE — PROGRESS NOTE ADULT - SUBJECTIVE AND OBJECTIVE BOX
Patient : Charlie Bhatt Age: 36 year old Sex: male   MRN: [id number] Encounter Date: 8/17/2018      History     Chief Complaint   Patient presents with   • Suicidal   • Trauma Major     36-year-old [place] by a belt in on police arrival patient was removed from the tree, CPR was started and defibrillator was applied with no shock indicated. On EMS arrival patient had a pulse and was in sinus tachycardia.  Thereafter patient arrested and was given epinephrine 1 mg IV and CPR was started and patient regained pulses. Patient was intubated with a 7.0 endotracheal tube prehospital.      The history is provided by the EMS personnel ([place] [facility]).   Suicidal     Trauma Major          ALLERGIES:  Not on File    Prior to Admission Medications    No medications on file       New Prescriptions    No medications on file       No past medical history on file.    No past surgical history on file.    No family history on file.    Social History   Substance Use Topics   • Smoking status: Not on file   • Smokeless tobacco: Not on file   • Alcohol use Not on file       Review of Systems   Unable to perform ROS: Intubated       Physical Exam     ED Triage Vitals   ED Triage Vitals Group      Temp --       Pulse 08/17/18 1915 113      Resp 08/17/18 1915 19      BP 08/17/18 1915 (!) 52/35      SpO2 08/17/18 1903 100 %      EtCO2 mmHg --       Height --       Weight 08/17/18 2018 184 lb (83.5 kg)      Weight Scale Used --        Physical Exam   Constitutional: He appears well-developed and well-nourished.   HENT:   Head: Normocephalic and atraumatic.   Eyes: Right pupil is not reactive. Left pupil is not reactive.   Pupils 4 mm-midpoint and nonreactive   Neck: No tracheal deviation and no edema present.       Endotracheally intubated   Cardiovascular: Regular rhythm and intact distal pulses.  Tachycardia present.    Pulmonary/Chest: No stridor. He has no wheezes. He has no rales.   Abdominal:  Soft. There is no tenderness. There is no rebound and no guarding.   Genitourinary: Rectum normal and penis normal.   Neurological: He is unresponsive. GCS eye subscore is 1. GCS verbal subscore is 1. GCS motor subscore is 1.   Skin: Skin is warm and dry. He is not diaphoretic.   Nursing note and vitals reviewed.        ED Course     Chest Tube Insertion  Date/Time: 8/17/2018 8:48 PM  Performed by: KIKO MUIR  Authorized by: KIKO MUIR     Consent:     Consent obtained:  Emergent situation  Pre-procedure details:     Skin preparation:  ChloraPrep    Preparation: Patient was prepped and draped in the usual sterile fashion    Sedation:     Sedation type:  Deep (propofol infusion)  Procedure details:     Placement location:  L anterior    Scalpel size:  11    Tube size (Fr):  8    Ultrasound guidance: no      Tube connected to:  Suction    Drainage characteristics:  Air only    Suture material:  2-0 silk    Dressing:  4x4 sterile gauze and petrolatum-impregnated gauze  Post-procedure details:     Post-insertion x-ray findings: tube in good position      Patient tolerance of procedure:  Tolerated well, no immediate complications    Critical Care  Performed by: KIKO MUIR  Authorized by: KIKO MUIR     Critical care provider statement:     Critical care time (minutes):  35    Critical care time was exclusive of:  Separately billable procedures and treating other patients    Critical care was necessary to treat or prevent imminent or life-threatening deterioration of the following conditions:  Trauma, respiratory failure and CNS failure or compromise    Critical care was time spent personally by me on the following activities:  Discussions with consultants, evaluation of patient's response to treatment, examination of patient, gastric intubation, interpretation of cardiac output measurements, ventilator management, re-evaluation of patient's condition, pulse oximetry, ordering and review of  UROLOGY DAILY PROGRESS NOTE:     Subjective: Patient seen and examined at bedside. No acute events overnight  Tolerating diet  no flatus no BM    Objective:  Vital signs  T(F): , Max: 98.8 (07-25-18 @ 04:00)  HR: 74 (07-25-18 @ 04:30)  BP: 103/67 (07-25-18 @ 04:30)  SpO2: 95% (07-25-18 @ 04:30)  Wt(kg): --    Output     I&O's Detail    24 Jul 2018 07:01  -  25 Jul 2018 07:00  --------------------------------------------------------  IN:    lactated ringers.: 1500 mL    Oral Fluid: 240 mL  Total IN: 1740 mL    OUT:    Indwelling Catheter - Urethral: 1165 mL  Total OUT: 1165 mL    Total NET: 575 mL          Physical Exam:  Gen: NAD  Abd: soft NTND  Back: no CVAT  : barboza clear removed   vaginal paking light red     Labs:  07-25  7.2   / 37.4  /0.84   07-24  10.8  / 41.9  /0.84                           12.5   7.2   )-----------( 273      ( 25 Jul 2018 02:43 )             37.4     07-25    141  |  105  |  13  ----------------------------<  107<H>  4.1   |  25  |  0.84    Ca    8.5      25 Jul 2018 02:43    LABS/RADIOLOGY RESULTS:                          12.5   7.2   )-----------( 273      ( 25 Jul 2018 02:43 )             37.4   07-25    141  |  105  |  13  ----------------------------<  107<H>  4.1   |  25  |  0.84    Ca    8.5      25 Jul 2018 02:43    Blood Cultures                Urine Cx: radiographic studies, ordering and review of laboratory studies and ordering and performing treatments and interventions  Comments:      Level I trauma activation        MDM  Trauma Level I activation prior to patient's arrival based on hanging (HASEEB) and intubated with GCS 3 and traumatic arrest prehospital.      Orders placed:  Orders Placed This Encounter   • XR Chest AP or PA   • XR CHEST AP OR PA - PORTABLE   • CT Angiogram Head and Neck   • XR Pelvis 1 View   • XR CHEST AP OR PA - PORTABLE   • CBC & Auto Differential   • Comprehensive Metabolic Panel   • Prothrombin Time   • Partial Thromboplastin Time   • Alcohol Level Serum   • Magnesium Level   • Arterial Blood Gas   • Acetaminophen Level   • Salicylate Level   • Drug Abuse Screen Basic Urine   • Arterial Blood Gas   • Partial Thromboplastin Time   • CBC & Auto Differential   • Comprehensive Metabolic Panel   • Magnesium Level   • Prothrombin Time   • Triglyceride   • Acetaminophen Level   • Salicylate Level   • Drug Abuse Screen Basic Urine   • Save for Possible XMatch   • Triglyceride   • Electrocardiogram 12-Lead   • Arterial Blood Gas - RT to Draw   • Spontaneous Volumes   • Capped IV with Sodium Chloride 0.9%   • Diagnosis    • Document analgesia (Pain Intensity Rating or Pain Associated Behaviors Tool) and sedation (RASS: Bhatti Agitation Sedation Scale)   • Daily interruption of sedation   • Pharmacy to review prior to admission medications for optimization of current pain and sedation regimen   • Discontinue Sedation and Analgesia Protocol within 30 minutes of extubation. Physician to re-evaluate patient and order medications as needed   • amiodarone (CORDARONE) injection 150 mg   • fentaNYL (SUBLIMAZE) injection  mcg   • fentaNYL (SUBLIMAZE) injection  mcg   • AND Linked Order Group    • chlorhexidine gluconate (PERIDEX) 0.12 % solution 15 mL    • chlorhexidine gluconate (PERIDEX) 0.12 % solution 15 mL   • petrolatum(white)/mineral  oil/NaCL (REFRESH PM, OCULAR LUBRICANT) ophthalmic ointment 1 application   • DISCONTD: propofol (DIPRIVAN) infusion   • iopamidol (ISOVUE-370) 76 % injection 100 mL   • sodium chloride 0.9 % injector flush 100 mL   • propofol (DIPRIVAN) infusion       Medications/Fluids ordered/given:  Medications   fentaNYL (SUBLIMAZE) injection  mcg (not administered)   fentaNYL (SUBLIMAZE) injection  mcg (not administered)   chlorhexidine gluconate (PERIDEX) 0.12 % solution 15 mL (not administered)     And   chlorhexidine gluconate (PERIDEX) 0.12 % solution 15 mL (not administered)   petrolatum(white)/mineral oil/NaCL (REFRESH PM, OCULAR LUBRICANT) ophthalmic ointment 1 application (not administered)   propofol (DIPRIVAN) infusion (not administered)   amiodarone (CORDARONE) injection 150 mg (150 mg Intravenous Given 8/17/18 2005)   iopamidol (ISOVUE-370) 76 % injection 100 mL (75 mLs Intravenous Given 8/17/18 2000)   sodium chloride 0.9 % injector flush 100 mL (80 mLs Injection Given 8/17/18 2000)       Labs:   Results for orders placed or performed during the hospital encounter of 08/17/18   Arterial Blood Gas   Result Value    pH Arterial 7.14 (LL)     Comment: CALLED TO, READ BACK AND CONFIRMED  BLANKA HUERTA AT 1955 ON 8/17/18 BY FIORELLA      PCO2 Arterial 46    PO2 181 (H)    HCO3 Arterial 15 (L)    Base Deficit Arterial 14 (H)    sO2 Arterial 98    fO2HB Arterial 97    MV FIO2 60   CBC & Auto Differential   Result Value    WBC 11.6 (H)    RBC 4.65    HGB 14.4    HCT 42.7    MCV 91.8    MCH 31.0    MCHC 33.7    RDW-CV 12.7        DIFF TYPE AUTOMATED DIFFERENTIAL    Neutrophil 50    LYMPH 44    MONO 5    EOSIN 1    BASO 0    Absolute Neutrophil 5.8    Absolute Lymph 5.1 (H)    Absolute Mono 0.6    Absolute Eos 0.1    Absolute Baso 0.1   Comprehensive Metabolic Panel   Result Value    Sodium 144    Potassium 3.6     Comment: NO VISIBLE HEMOLYSIS  ICTERIC      Chloride 103    Carbon Dioxide 17 (L)    Anion Gap 28 (H)     Glucose 173 (H)    BUN 16    Creatinine 1.24 (H)    GFR Estimate,  85     Comment: eGFR 60 - 89 mL/min/1.73m2 = Mild decrease in kidney function.    GFR Estimate, Non  73     Comment: eGFR 60 - 89 mL/min/1.73m2 = Mild decrease in kidney function.    BUN/Creatinine Ratio 13    CALCIUM 9.2    TOTAL BILIRUBIN 0.7    AST/SGOT 473 (H)    ALT/SGPT 734 (H)    ALK PHOSPHATASE 102    TOTAL PROTEIN 7.3    Albumin 4.0    GLOBULIN 3.3    A/G Ratio, Serum 1.2   Magnesium Level   Result Value    MAGNESIUM 2.6 (H)   Triglyceride   Result Value    TRIGLYCERIDE 164 (H)     Comment: Normal                   <150  Borderline High          150 to 199  High                     200 to 499  Very High                >=500     Acetaminophen Level   Result Value    ACETAMINOPHEN <2 (L)   Salicylate Level   Result Value    SALICYLATE 2.8   Drug Abuse Screen Basic Urine   Result Value    U-Amphetamines NEGATIVE     Comment: Cutoff = 500ng/ml    U-Barbiturates NEGATIVE     Comment: Cutoff = 200 ng/mL    U-Benzodiazepines NEGATIVE     Comment: Cutoff = 200 ng/mL    Cannabinoids (THC) UA NEGATIVE     Comment: cutoff = 50 ng/mL    U-Cocaine/Metabolite NEGATIVE     Comment: cutoff = 150 ng/ml    Opiates UA NEGATIVE     Comment: cutoff = 300 ng/mL    U-PHENCYCLIDINE NEGATIVE     Comment: Cutoff = 25 ng/mL  Drug screening is for medical purposes only.  A confirmation by GC/MS may be ordered to verify clinically questionable false positive results.     Save for Possible XMatch   Result Value    CROSSMATCH  2018      Lab interpretation: Metabolic acidosis.  Emergency Physician interpretation.    Radiology:   XR Chest AP or PA   Final Result   IMPRESSION:   1. Endotracheal tube high within the neck and should be repositioned.   2. Nasogastric tube with distal aspect just into the stomach. This should   be advanced 5 to 6 cm.         XR CHEST AP OR PA - PORTABLE    (Results Pending)   CT Angiogram Head and  Neck    (Results Pending)   XR Pelvis 1 View    (Results Pending)   XR CHEST AP OR PA - PORTABLE    (Results Pending)      Xrays demonstrate: Chest x-ray-no focal infiltrate, endotracheal tube is high, no pneumothorax.Emergency Physician interpretation.    EKG Interpretation:  Rate: 114  Rhythm: sinus tachycardia   Abnormality: yes  Incomplete right bundle branch block and sinus tachycardia with nonspecific ST abnormalities  Emergency Physician interpretation.    Rechecks:   Endotracheal tube repositioned  7:55 PM  Patient having 7-8 run beats of wide complex tachycardia. The patient's underlying rhythm is sinus tach at 160s. This may be just aberrant conduction. Patient's blood pressure 212 over 120s. We'll give amiodarone 150 mg over 10 minutes.    Patient fighting the ventilator. We'll place on a propofol infusion.    8:15 PM  Pulse 156. Blood pressure 192/128. Pulse ox is 97%.    Endotracheal tube advanced again.  8:25 PM  Oral gastric tube kept curling in back of patient's mouth.  Laryngoscope and Macgill forceps used to advance OG tube down esophagus after uncurling.  Insufflation confirms gastric placement.    8:27 PM  /128.  .  Pox 94%    9:04 PM  Wife, Ella Bhatt [phone], contacted and informed of patient's condition.  Patient has been missing in [place].  She is going to arrange to get up to [place] and has a 21 month old baby.  Asked to be contacted on change in status.    Consults:   7:33 PM  Tap line contacted for trauma transfer  7:44 PM  Dr. Goodman, trauma surgeon at [facility], contacted and will come and see the patient.  Agrees to treatment plan and transfer.  7:44 PM   Dr. Castro, trauma surgeon at [facility], discussed case and agrees with assessment and plan so far and request a pelvic x-ray.  Accepts patient in transfer directly to the ICU at [facility].  8:04 PM  Dr. Goodman in person in the ED.  8:35 PM  Radiologist reports no vascular injury, normal brain  appearance.  Moderate left pneumothorax.  8:43 PM  Dr. Castro updated on left sided pneumothorax and agrees with small chest tube.    Clinical Impression:  The primary encounter diagnosis was Asphyxiation due to hanging, undetermined intent, initial encounter. Diagnoses of Respiratory arrest before cardiac arrest (CMS/HCC), Anoxic brain injury (CMS/HCC), and Metabolic acidosis were also pertinent to this visit.    Pt to be transferred to [facility] ICU to Dr. Castro in critical condition.       Surjit Vences MD  08/18/18 [id number]

## 2018-08-20 ENCOUNTER — APPOINTMENT (OUTPATIENT)
Dept: UROLOGY | Facility: CLINIC | Age: 58
End: 2018-08-20
Payer: COMMERCIAL

## 2018-08-20 DIAGNOSIS — N81.4 UTEROVAGINAL PROLAPSE, UNSPECIFIED: ICD-10-CM

## 2018-08-20 PROCEDURE — 99024 POSTOP FOLLOW-UP VISIT: CPT

## 2018-08-22 LAB — BACTERIA UR CULT: NORMAL

## 2018-09-04 ENCOUNTER — CHART COPY (OUTPATIENT)
Age: 58
End: 2018-09-04

## 2018-10-29 ENCOUNTER — APPOINTMENT (OUTPATIENT)
Dept: UROLOGY | Facility: CLINIC | Age: 58
End: 2018-10-29
Payer: COMMERCIAL

## 2018-10-29 DIAGNOSIS — N32.81 OVERACTIVE BLADDER: ICD-10-CM

## 2018-10-29 DIAGNOSIS — N81.9 FEMALE GENITAL PROLAPSE, UNSPECIFIED: ICD-10-CM

## 2018-10-29 PROCEDURE — 99213 OFFICE O/P EST LOW 20 MIN: CPT

## 2018-11-20 ENCOUNTER — MEDICATION RENEWAL (OUTPATIENT)
Age: 58
End: 2018-11-20

## 2019-02-04 ENCOUNTER — APPOINTMENT (OUTPATIENT)
Dept: UROLOGY | Facility: CLINIC | Age: 59
End: 2019-02-04
Payer: COMMERCIAL

## 2019-02-04 DIAGNOSIS — N39.3 STRESS INCONTINENCE (FEMALE) (MALE): ICD-10-CM

## 2019-02-04 PROCEDURE — 99213 OFFICE O/P EST LOW 20 MIN: CPT

## 2019-02-04 NOTE — HISTORY OF PRESENT ILLNESS
[FreeTextEntry1] : 58 yo woman 6 months s/p RASCH/ASC, doing well. ROHIT with cough, rare. She had baseline antecedent to sx UUI which she treats with Detrol LA 2 mg daily, happy with voiding pattern. Denies prolapse sensation. \par \par Exam - gu - no prolapse noted, C -12 cm. neg cst.\par \par PVR - 0 ml. \par \par Plan: \par f/u in 6 prn. \par

## 2019-02-13 ENCOUNTER — OUTPATIENT (OUTPATIENT)
Dept: OUTPATIENT SERVICES | Facility: HOSPITAL | Age: 59
LOS: 1 days | End: 2019-02-13
Payer: COMMERCIAL

## 2019-02-13 ENCOUNTER — APPOINTMENT (OUTPATIENT)
Dept: MAMMOGRAPHY | Facility: IMAGING CENTER | Age: 59
End: 2019-02-13
Payer: COMMERCIAL

## 2019-02-13 DIAGNOSIS — Z00.8 ENCOUNTER FOR OTHER GENERAL EXAMINATION: ICD-10-CM

## 2019-02-13 DIAGNOSIS — Z98.890 OTHER SPECIFIED POSTPROCEDURAL STATES: Chronic | ICD-10-CM

## 2019-02-13 PROCEDURE — G0279: CPT | Mod: 26

## 2019-02-13 PROCEDURE — 77065 DX MAMMO INCL CAD UNI: CPT

## 2019-02-13 PROCEDURE — 77065 DX MAMMO INCL CAD UNI: CPT | Mod: 26,RT

## 2019-02-13 PROCEDURE — G0279: CPT

## 2019-02-23 ENCOUNTER — RX RENEWAL (OUTPATIENT)
Age: 59
End: 2019-02-23

## 2019-06-03 ENCOUNTER — RX RENEWAL (OUTPATIENT)
Age: 59
End: 2019-06-03

## 2019-06-25 ENCOUNTER — APPOINTMENT (OUTPATIENT)
Dept: MAMMOGRAPHY | Facility: IMAGING CENTER | Age: 59
End: 2019-06-25
Payer: COMMERCIAL

## 2019-06-25 ENCOUNTER — OUTPATIENT (OUTPATIENT)
Dept: OUTPATIENT SERVICES | Facility: HOSPITAL | Age: 59
LOS: 1 days | End: 2019-06-25
Payer: COMMERCIAL

## 2019-06-25 ENCOUNTER — APPOINTMENT (OUTPATIENT)
Dept: ULTRASOUND IMAGING | Facility: IMAGING CENTER | Age: 59
End: 2019-06-25
Payer: COMMERCIAL

## 2019-06-25 DIAGNOSIS — Z98.890 OTHER SPECIFIED POSTPROCEDURAL STATES: Chronic | ICD-10-CM

## 2019-06-25 DIAGNOSIS — Z00.8 ENCOUNTER FOR OTHER GENERAL EXAMINATION: ICD-10-CM

## 2019-06-25 PROCEDURE — 77067 SCR MAMMO BI INCL CAD: CPT

## 2019-06-25 PROCEDURE — 77063 BREAST TOMOSYNTHESIS BI: CPT

## 2019-06-25 PROCEDURE — 76641 ULTRASOUND BREAST COMPLETE: CPT | Mod: 26,50

## 2019-06-25 PROCEDURE — 76641 ULTRASOUND BREAST COMPLETE: CPT

## 2019-06-25 PROCEDURE — 77067 SCR MAMMO BI INCL CAD: CPT | Mod: 26

## 2019-06-25 PROCEDURE — 77063 BREAST TOMOSYNTHESIS BI: CPT | Mod: 26

## 2019-08-22 ENCOUNTER — RX RENEWAL (OUTPATIENT)
Age: 59
End: 2019-08-22

## 2020-06-29 ENCOUNTER — OUTPATIENT (OUTPATIENT)
Dept: OUTPATIENT SERVICES | Facility: HOSPITAL | Age: 60
LOS: 1 days | End: 2020-06-29
Payer: COMMERCIAL

## 2020-06-29 ENCOUNTER — APPOINTMENT (OUTPATIENT)
Dept: ULTRASOUND IMAGING | Facility: IMAGING CENTER | Age: 60
End: 2020-06-29
Payer: COMMERCIAL

## 2020-06-29 ENCOUNTER — APPOINTMENT (OUTPATIENT)
Dept: MAMMOGRAPHY | Facility: IMAGING CENTER | Age: 60
End: 2020-06-29
Payer: COMMERCIAL

## 2020-06-29 DIAGNOSIS — Z98.890 OTHER SPECIFIED POSTPROCEDURAL STATES: Chronic | ICD-10-CM

## 2020-06-29 DIAGNOSIS — Z00.8 ENCOUNTER FOR OTHER GENERAL EXAMINATION: ICD-10-CM

## 2020-06-29 PROCEDURE — 77063 BREAST TOMOSYNTHESIS BI: CPT | Mod: 26

## 2020-06-29 PROCEDURE — 76641 ULTRASOUND BREAST COMPLETE: CPT | Mod: 26,50

## 2020-06-29 PROCEDURE — 77063 BREAST TOMOSYNTHESIS BI: CPT

## 2020-06-29 PROCEDURE — 77067 SCR MAMMO BI INCL CAD: CPT

## 2020-06-29 PROCEDURE — 77067 SCR MAMMO BI INCL CAD: CPT | Mod: 26

## 2020-06-29 PROCEDURE — 76641 ULTRASOUND BREAST COMPLETE: CPT

## 2020-10-05 NOTE — PRE-OP CHECKLIST - WAS PATIENT ON BETA BLOCKER?

## 2021-09-07 ENCOUNTER — APPOINTMENT (OUTPATIENT)
Dept: ULTRASOUND IMAGING | Facility: IMAGING CENTER | Age: 61
End: 2021-09-07
Payer: COMMERCIAL

## 2021-09-07 ENCOUNTER — OUTPATIENT (OUTPATIENT)
Dept: OUTPATIENT SERVICES | Facility: HOSPITAL | Age: 61
LOS: 1 days | End: 2021-09-07
Payer: COMMERCIAL

## 2021-09-07 ENCOUNTER — APPOINTMENT (OUTPATIENT)
Dept: MAMMOGRAPHY | Facility: IMAGING CENTER | Age: 61
End: 2021-09-07
Payer: COMMERCIAL

## 2021-09-07 DIAGNOSIS — Z98.890 OTHER SPECIFIED POSTPROCEDURAL STATES: Chronic | ICD-10-CM

## 2021-09-07 DIAGNOSIS — Z00.8 ENCOUNTER FOR OTHER GENERAL EXAMINATION: ICD-10-CM

## 2021-09-07 PROCEDURE — 76641 ULTRASOUND BREAST COMPLETE: CPT

## 2021-09-07 PROCEDURE — 77063 BREAST TOMOSYNTHESIS BI: CPT

## 2021-09-07 PROCEDURE — 77067 SCR MAMMO BI INCL CAD: CPT | Mod: 26

## 2021-09-07 PROCEDURE — 77063 BREAST TOMOSYNTHESIS BI: CPT | Mod: 26

## 2021-09-07 PROCEDURE — 76641 ULTRASOUND BREAST COMPLETE: CPT | Mod: 26,50

## 2021-09-07 PROCEDURE — 77067 SCR MAMMO BI INCL CAD: CPT

## 2022-02-03 ENCOUNTER — RX RENEWAL (OUTPATIENT)
Age: 62
End: 2022-02-03

## 2022-09-20 ENCOUNTER — APPOINTMENT (OUTPATIENT)
Dept: MAMMOGRAPHY | Facility: IMAGING CENTER | Age: 62
End: 2022-09-20

## 2022-09-20 ENCOUNTER — OUTPATIENT (OUTPATIENT)
Dept: OUTPATIENT SERVICES | Facility: HOSPITAL | Age: 62
LOS: 1 days | End: 2022-09-20
Payer: COMMERCIAL

## 2022-09-20 DIAGNOSIS — Z00.8 ENCOUNTER FOR OTHER GENERAL EXAMINATION: ICD-10-CM

## 2022-09-20 DIAGNOSIS — Z98.890 OTHER SPECIFIED POSTPROCEDURAL STATES: Chronic | ICD-10-CM

## 2022-09-20 PROCEDURE — 77067 SCR MAMMO BI INCL CAD: CPT | Mod: 26

## 2022-09-20 PROCEDURE — 77063 BREAST TOMOSYNTHESIS BI: CPT

## 2022-09-20 PROCEDURE — 77063 BREAST TOMOSYNTHESIS BI: CPT | Mod: 26

## 2022-09-20 PROCEDURE — 77067 SCR MAMMO BI INCL CAD: CPT

## 2023-01-30 ENCOUNTER — RX RENEWAL (OUTPATIENT)
Age: 63
End: 2023-01-30

## 2023-09-25 ENCOUNTER — APPOINTMENT (OUTPATIENT)
Dept: RADIOLOGY | Facility: IMAGING CENTER | Age: 63
End: 2023-09-25
Payer: COMMERCIAL

## 2023-09-25 ENCOUNTER — OUTPATIENT (OUTPATIENT)
Dept: OUTPATIENT SERVICES | Facility: HOSPITAL | Age: 63
LOS: 1 days | End: 2023-09-25
Payer: COMMERCIAL

## 2023-09-25 ENCOUNTER — APPOINTMENT (OUTPATIENT)
Dept: MAMMOGRAPHY | Facility: IMAGING CENTER | Age: 63
End: 2023-09-25
Payer: COMMERCIAL

## 2023-09-25 DIAGNOSIS — Z98.890 OTHER SPECIFIED POSTPROCEDURAL STATES: Chronic | ICD-10-CM

## 2023-09-25 DIAGNOSIS — Z00.8 ENCOUNTER FOR OTHER GENERAL EXAMINATION: ICD-10-CM

## 2023-09-25 PROCEDURE — 77063 BREAST TOMOSYNTHESIS BI: CPT | Mod: 26

## 2023-09-25 PROCEDURE — 77080 DXA BONE DENSITY AXIAL: CPT | Mod: 26

## 2023-09-25 PROCEDURE — 77067 SCR MAMMO BI INCL CAD: CPT | Mod: 26

## 2023-09-25 PROCEDURE — 77080 DXA BONE DENSITY AXIAL: CPT

## 2023-09-25 PROCEDURE — 77067 SCR MAMMO BI INCL CAD: CPT

## 2023-09-25 PROCEDURE — 77063 BREAST TOMOSYNTHESIS BI: CPT

## 2024-01-24 ENCOUNTER — RX RENEWAL (OUTPATIENT)
Age: 64
End: 2024-01-24

## 2024-01-24 RX ORDER — TOLTERODINE TARTRATE 4 MG/1
4 CAPSULE, EXTENDED RELEASE ORAL
Qty: 90 | Refills: 3 | Status: ACTIVE | COMMUNITY
Start: 2018-09-04 | End: 1900-01-01

## 2024-10-01 ENCOUNTER — OUTPATIENT (OUTPATIENT)
Dept: OUTPATIENT SERVICES | Facility: HOSPITAL | Age: 64
LOS: 1 days | End: 2024-10-01
Payer: COMMERCIAL

## 2024-10-01 ENCOUNTER — APPOINTMENT (OUTPATIENT)
Dept: MAMMOGRAPHY | Facility: IMAGING CENTER | Age: 64
End: 2024-10-01
Payer: COMMERCIAL

## 2024-10-01 DIAGNOSIS — Z98.890 OTHER SPECIFIED POSTPROCEDURAL STATES: Chronic | ICD-10-CM

## 2024-10-01 DIAGNOSIS — Z00.8 ENCOUNTER FOR OTHER GENERAL EXAMINATION: ICD-10-CM

## 2024-10-01 PROCEDURE — 77063 BREAST TOMOSYNTHESIS BI: CPT | Mod: 26

## 2024-10-01 PROCEDURE — 77067 SCR MAMMO BI INCL CAD: CPT

## 2024-10-01 PROCEDURE — 77063 BREAST TOMOSYNTHESIS BI: CPT

## 2024-10-01 PROCEDURE — 77067 SCR MAMMO BI INCL CAD: CPT | Mod: 26

## 2025-02-20 ENCOUNTER — RX RENEWAL (OUTPATIENT)
Age: 65
End: 2025-02-20